# Patient Record
Sex: MALE | Race: WHITE | NOT HISPANIC OR LATINO | Employment: FULL TIME | URBAN - METROPOLITAN AREA
[De-identification: names, ages, dates, MRNs, and addresses within clinical notes are randomized per-mention and may not be internally consistent; named-entity substitution may affect disease eponyms.]

---

## 2019-06-03 PROBLEM — E66.01 SEVERE OBESITY (BMI 35.0-39.9) WITH COMORBIDITY: Status: ACTIVE | Noted: 2019-06-03

## 2019-12-09 PROBLEM — I10 ESSENTIAL HYPERTENSION: Status: ACTIVE | Noted: 2019-12-09

## 2020-05-11 ENCOUNTER — TELEPHONE (OUTPATIENT)
Dept: ORTHOPEDICS | Facility: CLINIC | Age: 45
End: 2020-05-11

## 2020-05-11 NOTE — TELEPHONE ENCOUNTER
Spoke with patient on the phone, appt made, called mother per pt request to give her address.    Afsaneh Lujan LPN

## 2020-05-12 ENCOUNTER — OFFICE VISIT (OUTPATIENT)
Dept: ORTHOPEDICS | Facility: CLINIC | Age: 45
End: 2020-05-12
Payer: COMMERCIAL

## 2020-05-12 ENCOUNTER — TELEPHONE (OUTPATIENT)
Dept: ORTHOPEDICS | Facility: CLINIC | Age: 45
End: 2020-05-12

## 2020-05-12 ENCOUNTER — LAB VISIT (OUTPATIENT)
Dept: INTERNAL MEDICINE | Facility: CLINIC | Age: 45
End: 2020-05-12
Payer: COMMERCIAL

## 2020-05-12 ENCOUNTER — ANESTHESIA EVENT (OUTPATIENT)
Dept: SURGERY | Facility: HOSPITAL | Age: 45
End: 2020-05-12
Payer: COMMERCIAL

## 2020-05-12 VITALS
DIASTOLIC BLOOD PRESSURE: 98 MMHG | HEART RATE: 80 BPM | BODY MASS INDEX: 37.8 KG/M2 | WEIGHT: 270 LBS | HEIGHT: 71 IN | SYSTOLIC BLOOD PRESSURE: 160 MMHG

## 2020-05-12 DIAGNOSIS — S56.429A EXTENSOR TENDON LACERATION OF FINGER WITH OPEN WOUND, INITIAL ENCOUNTER: ICD-10-CM

## 2020-05-12 DIAGNOSIS — Z01.818 PREOP EXAMINATION: ICD-10-CM

## 2020-05-12 DIAGNOSIS — S62.620B OPEN DISPLACED FRACTURE OF MIDDLE PHALANX OF RIGHT INDEX FINGER, INITIAL ENCOUNTER: Primary | ICD-10-CM

## 2020-05-12 DIAGNOSIS — S61.209A EXTENSOR TENDON LACERATION OF FINGER WITH OPEN WOUND, INITIAL ENCOUNTER: ICD-10-CM

## 2020-05-12 DIAGNOSIS — S62.620B: ICD-10-CM

## 2020-05-12 DIAGNOSIS — Z01.818 PREOPERATIVE CLEARANCE: Primary | ICD-10-CM

## 2020-05-12 PROCEDURE — 99999 PR PBB SHADOW E&M-EST. PATIENT-LVL IV: CPT | Mod: PBBFAC,,, | Performed by: ORTHOPAEDIC SURGERY

## 2020-05-12 PROCEDURE — 99204 OFFICE O/P NEW MOD 45 MIN: CPT | Mod: S$GLB,,, | Performed by: ORTHOPAEDIC SURGERY

## 2020-05-12 PROCEDURE — 3080F DIAST BP >= 90 MM HG: CPT | Mod: CPTII,S$GLB,, | Performed by: ORTHOPAEDIC SURGERY

## 2020-05-12 PROCEDURE — U0002 COVID-19 LAB TEST NON-CDC: HCPCS

## 2020-05-12 PROCEDURE — 3008F PR BODY MASS INDEX (BMI) DOCUMENTED: ICD-10-PCS | Mod: CPTII,S$GLB,, | Performed by: ORTHOPAEDIC SURGERY

## 2020-05-12 PROCEDURE — 3077F PR MOST RECENT SYSTOLIC BLOOD PRESSURE >= 140 MM HG: ICD-10-PCS | Mod: CPTII,S$GLB,, | Performed by: ORTHOPAEDIC SURGERY

## 2020-05-12 PROCEDURE — 99204 PR OFFICE/OUTPT VISIT, NEW, LEVL IV, 45-59 MIN: ICD-10-PCS | Mod: S$GLB,,, | Performed by: ORTHOPAEDIC SURGERY

## 2020-05-12 PROCEDURE — 99999 PR PBB SHADOW E&M-EST. PATIENT-LVL IV: ICD-10-PCS | Mod: PBBFAC,,, | Performed by: ORTHOPAEDIC SURGERY

## 2020-05-12 PROCEDURE — 3077F SYST BP >= 140 MM HG: CPT | Mod: CPTII,S$GLB,, | Performed by: ORTHOPAEDIC SURGERY

## 2020-05-12 PROCEDURE — 3008F BODY MASS INDEX DOCD: CPT | Mod: CPTII,S$GLB,, | Performed by: ORTHOPAEDIC SURGERY

## 2020-05-12 PROCEDURE — 3080F PR MOST RECENT DIASTOLIC BLOOD PRESSURE >= 90 MM HG: ICD-10-PCS | Mod: CPTII,S$GLB,, | Performed by: ORTHOPAEDIC SURGERY

## 2020-05-12 RX ORDER — MUPIROCIN 20 MG/G
OINTMENT TOPICAL
Status: CANCELLED | OUTPATIENT
Start: 2020-05-12

## 2020-05-12 RX ORDER — SODIUM CHLORIDE 9 MG/ML
INJECTION, SOLUTION INTRAVENOUS CONTINUOUS
Status: CANCELLED | OUTPATIENT
Start: 2020-05-12

## 2020-05-12 NOTE — H&P (VIEW-ONLY)
[facility]  History & Physical  Orthopedics    SUBJECTIVE:      Chief Complaint: Right index finger injury    Referring Provider:      History of Present Illness:  Patient is a 45 y.o. right hand dominant male who presents today with complaints of right index finger injury. The patient was using a router for woodworking yesterday when the router ended up penetrating the radial aspect of his P2 of index finger. He denies any pulsatile bleeding, but was unable to move his digit. Per ER notes he presented to [place] ED where he had a Grade III open P2 fx of his index finger. He was given 1x ancef, his wound wound I&D, primarily closed, placed in finger splint, dc'd on kelfex, and sent to our clinic for initial evaluation. He endorses being up to date on tetanus and denies any dirt in the router. He denies any fevers/chills, CP, N/V, abd pain, bowel/bladder changes. Of note the patient had a distal radius fracture of his ipsilateral wrist 20 years prior that severely limited his ROM and had much pain of his wrist that caused him to have 4 corner fusion by a hand surgeon at [facility] 5 years ago (he doesn't remember the surgeon), but has recovered well other than having limited wrist extension. He also endorses his a superficial laceration over the volar aspect of his involved digit several years ago that required stitches that did not result in any nerve or tendon damage.     The patient is a/an .    Onset of symptoms/DOI was 1 day ago.    Symptoms are aggravated by activity and movement.    Symptoms are alleviated by rest, immobilization and medication.    Symptoms consist of pain, swelling, laceration and decreased ROM.    The patient rates their pain as a 9/10.    Attempted treatment(s) and/or interventions include rest, activity modification, anti-inflammatory medications, immobilization, I&D, closed reduction in the emergency department and splinting/casting.     The patient denies  any fevers, chills, N/V, D/C and presents for evaluation.       Past Medical History:   Diagnosis Date    GERD (gastroesophageal reflux disease)     Peptic ulcer      Past Surgical History:   Procedure Laterality Date    LEG SURGERY Bilateral     lymph node removal       Review of patient's allergies indicates:   Allergen Reactions    Tramadol Other (See Comments)     Feels bad     Social History     Social History Narrative    Not on file     Family History   Problem Relation Age of Onset    Diabetes Mother     No Known Problems Father     Down syndrome Sister     No Known Problems Brother     No Known Problems Daughter     No Known Problems Brother     No Known Problems Daughter          Current Outpatient Medications:     cephALEXin (KEFLEX) 500 MG capsule, Take 1 capsule (500 mg total) by mouth every 8 (eight) hours. for 5 days, Disp: 15 capsule, Rfl: 0    gabapentin (NEURONTIN) 800 MG tablet, Take 1 tablet (800 mg total) by mouth 3 (three) times daily., Disp: 90 tablet, Rfl: 11    HYDROcodone-acetaminophen (NORCO) 5-325 mg per tablet, Take 1 tablet by mouth every 6 (six) hours as needed for Pain., Disp: 12 tablet, Rfl: 0    lisinopril 10 MG tablet, Take 1 tablet (10 mg total) by mouth every evening., Disp: 90 tablet, Rfl: 3    meloxicam (MOBIC) 15 MG tablet, Take 1 tablet (15 mg total) by mouth daily as needed for Pain., Disp: 30 tablet, Rfl: 11    omega-3 fatty acids-epa 1 gram Cap, Take 2 capsules by mouth 2 (two) times daily. (Patient not taking: Reported on 12/9/2019), Disp: 30 capsule, Rfl: 0    pravastatin (PRAVACHOL) 40 MG tablet, Take 1 tablet (40 mg total) by mouth every evening., Disp: 90 tablet, Rfl: 3    tiZANidine (ZANAFLEX) 4 MG tablet, Take 1 tablet (4 mg total) by mouth 3 (three) times daily as needed (muscle spasms)., Disp: 90 tablet, Rfl: 11    traZODone (DESYREL) 50 MG tablet, Take 1 tablet (50 mg total) by mouth nightly., Disp: 90 tablet, Rfl: 3      Review of  "Systems:  Constitutional: no fever or chills  Eyes: no visual changes  ENT: no nasal congestion or sore throat  Respiratory: no cough or shortness of breath  Cardiovascular: no chest pain  Gastrointestinal: no nausea or vomiting, tolerating diet  Musculoskeletal: arthralgias, myalgias, numbness/tingling and decreased ROM    OBJECTIVE:      Vital Signs (Most Recent):  Vitals:    05/12/20 1118   BP: (!) 160/98   Pulse: 80   Weight: 122.5 kg (270 lb)   Height: 5' 11" (1.803 m)     Body mass index is 37.66 kg/m².      Physical Exam:  Constitutional: The patient appears well-developed and well-nourished. No distress.   Head: Normocephalic and atraumatic.   Nose: Nose normal.   Eyes: Conjunctivae and EOM are normal.   Neck: No tracheal deviation present.   Cardiovascular: Normal rate and intact distal pulses.    Pulmonary/Chest: Effort normal. No respiratory distress.   Abdominal: There is no guarding.   Neurological: The patient is alert.   Psychiatric: The patient has a normal mood and affect.     Right Hand/Wrist Examination:    Observation/Inspection:  Swelling  YES    Deformity  YES  Discoloration  none     Scars   none    Atrophy  None  4cm oblique laceration over the radial aspect of his index finger that starts proximal to DIPJ , traverses PIPJ, and stops distal to MCPJ. There is mild SS drainage, but no purulence. Stitches are in place and intact w/out signs of dehiscence.   Unable to appreciate healed volar laceration and has a healed transverse surgical scar over dorsal wrist from his 4 corner fusion    HAND/WRIST EXAMINATION:  Finkelstein's Test   Neg  WHAT Test    Neg  Snuff box tenderness   Neg  Rhodes's Test    Neg  Hook of Hamate Tenderness  Neg  CMC grind    Neg  Circumduction test   Neg    Neurovascular Exam:  Digits WWP, brisk CR < 3s throughout  NVI motor/LTS to M/R/U nerves, radial pulse 2+  Tinel's Test - Carpal Tunnel  Neg  Tinel's Test - Cubital Tunnel  Neg  Phalen's Test    Neg  Median Nerve " Compression Test Neg    ROM wrist/elbow full, painless    FDS, FDP of index finger intact, but KIMMIE not intact. ROM was very limited 2/2 to finger edema and pain  Cap refill <3s in distal phalanx  Sensation intact to light touch over radial and ulnar aspect of index finger    RRR  CTAB  Abd S/NT/ND +BS    Diagnostic Results:     Xray - Open comminuted P2 of index finger with significant loss of bone.   EMG - none    ASSESSMENT/PLAN:      Kelby Finley is a 45 y.o. 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 He was counseled that not only does his fracture need to be reduced, he would need his wound I&D'd, likely need repair of his R extensor indices tendon, possibly bone grafting to bridge his bone loss of his P2, and possibly repair any nerves that are damaged. He is agreeable to this and would like to proceed with the above tomorrow 5/13/20. Consents were signed and surgery was booked during this visit. He was also placed in a removable dorsal finger splint as well.      Please be aware that this note has been generated with the assistance of Ada voice-to-text.  Please excuse any spelling or grammatical errors.

## 2020-05-12 NOTE — LETTER
May 12, 2020      No Recipients           [facility]  [address]  [place] [address]  Phone: [phone]  Fax: [phone]          Patient: Kelby Finley   MR Number: [id number]   YOB: 1975   Date of Visit: 5/12/2020       Dear :    Thank you for referring Kelby Finley to me for evaluation. Attached you will find relevant portions of my assessment and plan of care.    If you have questions, please do not hesitate to call me. I look forward to following Kelby Finley along with you.    Sincerely,    Randal Odom MD    Enclosure  CC:  No Recipients    If you would like to receive this communication electronically, please contact [email or web address] or [phone] to request more information on [employer] Link access.    For providers and/or their staff who would like to refer a patient to [facility], please contact us through our one-stop-shop provider referral line, [facility], at [phone].    If you feel you have received this communication in error or would no longer like to receive these types of communications, please e-mail [email or web address]

## 2020-05-12 NOTE — TELEPHONE ENCOUNTER
Spoke with patient on the phone and gave him his surgery arrival time of 8:15am and also reminded  Him to not eat or drink after midnight and about his 2 week post op appointment, pt verbalized understanding.    Afsaneh Lujan LPN

## 2020-05-12 NOTE — H&P
[facility]  History & Physical  Orthopedics    SUBJECTIVE:      Chief Complaint: Right index finger injury    Referring Provider:      History of Present Illness:  Patient is a 45 y.o. right hand dominant male who presents today with complaints of right index finger injury. The patient was using a router for woodworking yesterday when the router ended up penetrating the radial aspect of his P2 of index finger. He denies any pulsatile bleeding, but was unable to move his digit. Per ER notes he presented to [place] ED where he had a Grade III open P2 fx of his index finger. He was given 1x ancef, his wound wound I&D, primarily closed, placed in finger splint, dc'd on kelfex, and sent to our clinic for initial evaluation. He endorses being up to date on tetanus and denies any dirt in the router. He denies any fevers/chills, CP, N/V, abd pain, bowel/bladder changes. Of note the patient had a distal radius fracture of his ipsilateral wrist 20 years prior that severely limited his ROM and had much pain of his wrist that caused him to have 4 corner fusion by a hand surgeon at [facility] 5 years ago (he doesn't remember the surgeon), but has recovered well other than having limited wrist extension. He also endorses his a superficial laceration over the volar aspect of his involved digit several years ago that required stitches that did not result in any nerve or tendon damage.     The patient is a/an .    Onset of symptoms/DOI was 1 day ago.    Symptoms are aggravated by activity and movement.    Symptoms are alleviated by rest, immobilization and medication.    Symptoms consist of pain, swelling, laceration and decreased ROM.    The patient rates their pain as a 9/10.    Attempted treatment(s) and/or interventions include rest, activity modification, anti-inflammatory medications, immobilization, I&D, closed reduction in the emergency department and splinting/casting.     The patient denies  any fevers, chills, N/V, D/C and presents for evaluation.       Past Medical History:   Diagnosis Date    GERD (gastroesophageal reflux disease)     Peptic ulcer      Past Surgical History:   Procedure Laterality Date    LEG SURGERY Bilateral     lymph node removal       Review of patient's allergies indicates:   Allergen Reactions    Tramadol Other (See Comments)     Feels bad     Social History     Social History Narrative    Not on file     Family History   Problem Relation Age of Onset    Diabetes Mother     No Known Problems Father     Down syndrome Sister     No Known Problems Brother     No Known Problems Daughter     No Known Problems Brother     No Known Problems Daughter          Current Outpatient Medications:     cephALEXin (KEFLEX) 500 MG capsule, Take 1 capsule (500 mg total) by mouth every 8 (eight) hours. for 5 days, Disp: 15 capsule, Rfl: 0    gabapentin (NEURONTIN) 800 MG tablet, Take 1 tablet (800 mg total) by mouth 3 (three) times daily., Disp: 90 tablet, Rfl: 11    HYDROcodone-acetaminophen (NORCO) 5-325 mg per tablet, Take 1 tablet by mouth every 6 (six) hours as needed for Pain., Disp: 12 tablet, Rfl: 0    lisinopril 10 MG tablet, Take 1 tablet (10 mg total) by mouth every evening., Disp: 90 tablet, Rfl: 3    meloxicam (MOBIC) 15 MG tablet, Take 1 tablet (15 mg total) by mouth daily as needed for Pain., Disp: 30 tablet, Rfl: 11    omega-3 fatty acids-epa 1 gram Cap, Take 2 capsules by mouth 2 (two) times daily. (Patient not taking: Reported on 12/9/2019), Disp: 30 capsule, Rfl: 0    pravastatin (PRAVACHOL) 40 MG tablet, Take 1 tablet (40 mg total) by mouth every evening., Disp: 90 tablet, Rfl: 3    tiZANidine (ZANAFLEX) 4 MG tablet, Take 1 tablet (4 mg total) by mouth 3 (three) times daily as needed (muscle spasms)., Disp: 90 tablet, Rfl: 11    traZODone (DESYREL) 50 MG tablet, Take 1 tablet (50 mg total) by mouth nightly., Disp: 90 tablet, Rfl: 3      Review of  "Systems:  Constitutional: no fever or chills  Eyes: no visual changes  ENT: no nasal congestion or sore throat  Respiratory: no cough or shortness of breath  Cardiovascular: no chest pain  Gastrointestinal: no nausea or vomiting, tolerating diet  Musculoskeletal: arthralgias, myalgias, numbness/tingling and decreased ROM    OBJECTIVE:      Vital Signs (Most Recent):  Vitals:    05/12/20 1118   BP: (!) 160/98   Pulse: 80   Weight: 122.5 kg (270 lb)   Height: 5' 11" (1.803 m)     Body mass index is 37.66 kg/m².      Physical Exam:  Constitutional: The patient appears well-developed and well-nourished. No distress.   Head: Normocephalic and atraumatic.   Nose: Nose normal.   Eyes: Conjunctivae and EOM are normal.   Neck: No tracheal deviation present.   Cardiovascular: Normal rate and intact distal pulses.    Pulmonary/Chest: Effort normal. No respiratory distress.   Abdominal: There is no guarding.   Neurological: The patient is alert.   Psychiatric: The patient has a normal mood and affect.     Right Hand/Wrist Examination:    Observation/Inspection:  Swelling  YES    Deformity  YES  Discoloration  none     Scars   none    Atrophy  None  4cm oblique laceration over the radial aspect of his index finger that starts proximal to DIPJ , traverses PIPJ, and stops distal to MCPJ. There is mild SS drainage, but no purulence. Stitches are in place and intact w/out signs of dehiscence.   Unable to appreciate healed volar laceration and has a healed transverse surgical scar over dorsal wrist from his 4 corner fusion    HAND/WRIST EXAMINATION:  Finkelstein's Test   Neg  WHAT Test    Neg  Snuff box tenderness   Neg  Rhodes's Test    Neg  Hook of Hamate Tenderness  Neg  CMC grind    Neg  Circumduction test   Neg    Neurovascular Exam:  Digits WWP, brisk CR < 3s throughout  NVI motor/LTS to M/R/U nerves, radial pulse 2+  Tinel's Test - Carpal Tunnel  Neg  Tinel's Test - Cubital Tunnel  Neg  Phalen's Test    Neg  Median Nerve " Compression Test Neg    ROM wrist/elbow full, painless    FDS, FDP of index finger intact, but KIMMIE not intact. ROM was very limited 2/2 to finger edema and pain  Cap refill <3s in distal phalanx  Sensation intact to light touch over radial and ulnar aspect of index finger    RRR  CTAB  Abd S/NT/ND +BS    Diagnostic Results:     Xray - Open comminuted P2 of index finger with significant loss of bone.   EMG - none    ASSESSMENT/PLAN:      Kelby Finley is a 45 y.o. 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 He was counseled that not only does his fracture need to be reduced, he would need his wound I&D'd, likely need repair of his R extensor indices tendon, possibly bone grafting to bridge his bone loss of his P2, and possibly repair any nerves that are damaged. He is agreeable to this and would like to proceed with the above tomorrow 5/13/20. Consents were signed and surgery was booked during this visit. He was also placed in a removable dorsal finger splint as well.      Please be aware that this note has been generated with the assistance of Ada voice-to-text.  Please excuse any spelling or grammatical errors.

## 2020-05-12 NOTE — PROGRESS NOTES
[facility]  History & Physical  Orthopedics    SUBJECTIVE:      Chief Complaint: Right index finger injury    Referring Provider:      History of Present Illness:  Patient is a 45 y.o. right hand dominant male who presents today with complaints of right index finger injury. The patient was using a router for woodworking yesterday when the router ended up penetrating the radial aspect of his P2 of index finger. He denies any pulsatile bleeding, but was unable to move his digit. Per ER notes he presented to [place] ED where he had a Grade III open P2 fx of his index finger. He was given 1x ancef, his wound wound I&D, primarily closed, placed in finger splint, dc'd on kelfex, and sent to our clinic for initial evaluation. He endorses being up to date on tetanus and denies any dirt in the router. He denies any fevers/chills, CP, N/V, abd pain, bowel/bladder changes. Of note the patient had a distal radius fracture of his ipsilateral wrist 20 years prior that severely limited his ROM and had much pain of his wrist that caused him to have 4 corner fusion by a hand surgeon at [facility] 5 years ago (he doesn't remember the surgeon), but has recovered well other than having limited wrist extension. He also endorses his a superficial laceration over the volar aspect of his involved digit several years ago that required stitches that did not result in any nerve or tendon damage.     The patient is a/an .    Onset of symptoms/DOI was 1 day ago.    Symptoms are aggravated by activity and movement.    Symptoms are alleviated by rest, immobilization and medication.    Symptoms consist of pain, swelling, laceration and decreased ROM.    The patient rates their pain as a 9/10.    Attempted treatment(s) and/or interventions include rest, activity modification, anti-inflammatory medications, immobilization, I&D, closed reduction in the emergency department and splinting/casting.     The patient denies  any fevers, chills, N/V, D/C and presents for evaluation.       Past Medical History:   Diagnosis Date    GERD (gastroesophageal reflux disease)     Peptic ulcer      Past Surgical History:   Procedure Laterality Date    LEG SURGERY Bilateral     lymph node removal       Review of patient's allergies indicates:   Allergen Reactions    Tramadol Other (See Comments)     Feels bad     Social History     Social History Narrative    Not on file     Family History   Problem Relation Age of Onset    Diabetes Mother     No Known Problems Father     Down syndrome Sister     No Known Problems Brother     No Known Problems Daughter     No Known Problems Brother     No Known Problems Daughter          Current Outpatient Medications:     cephALEXin (KEFLEX) 500 MG capsule, Take 1 capsule (500 mg total) by mouth every 8 (eight) hours. for 5 days, Disp: 15 capsule, Rfl: 0    gabapentin (NEURONTIN) 800 MG tablet, Take 1 tablet (800 mg total) by mouth 3 (three) times daily., Disp: 90 tablet, Rfl: 11    HYDROcodone-acetaminophen (NORCO) 5-325 mg per tablet, Take 1 tablet by mouth every 6 (six) hours as needed for Pain., Disp: 12 tablet, Rfl: 0    lisinopril 10 MG tablet, Take 1 tablet (10 mg total) by mouth every evening., Disp: 90 tablet, Rfl: 3    meloxicam (MOBIC) 15 MG tablet, Take 1 tablet (15 mg total) by mouth daily as needed for Pain., Disp: 30 tablet, Rfl: 11    omega-3 fatty acids-epa 1 gram Cap, Take 2 capsules by mouth 2 (two) times daily. (Patient not taking: Reported on 12/9/2019), Disp: 30 capsule, Rfl: 0    pravastatin (PRAVACHOL) 40 MG tablet, Take 1 tablet (40 mg total) by mouth every evening., Disp: 90 tablet, Rfl: 3    tiZANidine (ZANAFLEX) 4 MG tablet, Take 1 tablet (4 mg total) by mouth 3 (three) times daily as needed (muscle spasms)., Disp: 90 tablet, Rfl: 11    traZODone (DESYREL) 50 MG tablet, Take 1 tablet (50 mg total) by mouth nightly., Disp: 90 tablet, Rfl: 3      Review of  "Systems:  Constitutional: no fever or chills  Eyes: no visual changes  ENT: no nasal congestion or sore throat  Respiratory: no cough or shortness of breath  Cardiovascular: no chest pain  Gastrointestinal: no nausea or vomiting, tolerating diet  Musculoskeletal: arthralgias, myalgias, numbness/tingling and decreased ROM    OBJECTIVE:      Vital Signs (Most Recent):  Vitals:    05/12/20 1118   BP: (!) 160/98   Pulse: 80   Weight: 122.5 kg (270 lb)   Height: 5' 11" (1.803 m)     Body mass index is 37.66 kg/m².      Physical Exam:  Constitutional: The patient appears well-developed and well-nourished. No distress.   Head: Normocephalic and atraumatic.   Nose: Nose normal.   Eyes: Conjunctivae and EOM are normal.   Neck: No tracheal deviation present.   Cardiovascular: Normal rate and intact distal pulses.    Pulmonary/Chest: Effort normal. No respiratory distress.   Abdominal: There is no guarding.   Neurological: The patient is alert.   Psychiatric: The patient has a normal mood and affect.     Right Hand/Wrist Examination:    Observation/Inspection:  Swelling  YES    Deformity  YES  Discoloration  none     Scars   none    Atrophy  None  4cm oblique laceration over the radial aspect of his index finger that starts proximal to DIPJ , traverses PIPJ, and stops distal to MCPJ. There is mild SS drainage, but no purulence. Stitches are in place and intact w/out signs of dehiscence.   Unable to appreciate healed volar laceration and has a healed transverse surgical scar over dorsal wrist from his 4 corner fusion    HAND/WRIST EXAMINATION:  Finkelstein's Test   Neg  WHAT Test    Neg  Snuff box tenderness   Neg  Rhodes's Test    Neg  Hook of Hamate Tenderness  Neg  CMC grind    Neg  Circumduction test   Neg    Neurovascular Exam:  Digits WWP, brisk CR < 3s throughout  NVI motor/LTS to M/R/U nerves, radial pulse 2+  Tinel's Test - Carpal Tunnel  Neg  Tinel's Test - Cubital Tunnel  Neg  Phalen's Test    Neg  Median Nerve " Compression Test Neg    ROM wrist/elbow full, painless    FDS, FDP of index finger intact, but KIMMIE not intact. ROM was very limited 2/2 to finger edema and pain  Cap refill <3s in distal phalanx  Sensation intact to light touch over radial and ulnar aspect of index finger    RRR  CTAB  Abd S/NT/ND +BS    Diagnostic Results:     Xray - Open comminuted P2 of index finger with significant loss of bone.   EMG - none    ASSESSMENT/PLAN:      Kelby Finley is a 45 y.o. 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 He was counseled that not only does his fracture need to be reduced, he would need his wound I&D'd, likely need repair of his R extensor indices tendon, possibly bone grafting to bridge his bone loss of his P2, and possibly repair any nerves that are damaged. He is agreeable to this and would like to proceed with the above tomorrow 5/13/20. Consents were signed and surgery was booked during this visit. He was also placed in a removable dorsal finger splint as well.      Please be aware that this note has been generated with the assistance of [employer] voice-to-text.  Please excuse any spelling or grammatical errors.

## 2020-05-13 ENCOUNTER — ANESTHESIA (OUTPATIENT)
Dept: SURGERY | Facility: HOSPITAL | Age: 45
End: 2020-05-13
Payer: COMMERCIAL

## 2020-05-13 ENCOUNTER — HOSPITAL ENCOUNTER (OUTPATIENT)
Facility: HOSPITAL | Age: 45
Discharge: HOME | End: 2020-05-13
Attending: ORTHOPAEDIC SURGERY | Admitting: ORTHOPAEDIC SURGERY
Payer: COMMERCIAL

## 2020-05-13 VITALS
TEMPERATURE: 98 F | DIASTOLIC BLOOD PRESSURE: 76 MMHG | HEIGHT: 71 IN | WEIGHT: 270 LBS | RESPIRATION RATE: 20 BRPM | HEART RATE: 78 BPM | BODY MASS INDEX: 37.8 KG/M2 | SYSTOLIC BLOOD PRESSURE: 139 MMHG | OXYGEN SATURATION: 95 %

## 2020-05-13 DIAGNOSIS — S62.620B OPEN DISPLACED FRACTURE OF MIDDLE PHALANX OF RIGHT INDEX FINGER, INITIAL ENCOUNTER: ICD-10-CM

## 2020-05-13 DIAGNOSIS — S61.209A EXTENSOR TENDON LACERATION OF FINGER WITH OPEN WOUND, INITIAL ENCOUNTER: ICD-10-CM

## 2020-05-13 DIAGNOSIS — Z01.818 PREOP EXAMINATION: ICD-10-CM

## 2020-05-13 DIAGNOSIS — S62.620B: Primary | ICD-10-CM

## 2020-05-13 DIAGNOSIS — S56.429A EXTENSOR TENDON LACERATION OF FINGER WITH OPEN WOUND, INITIAL ENCOUNTER: ICD-10-CM

## 2020-05-13 LAB — SARS-COV-2 RNA RESP QL NAA+PROBE: NOT DETECTED

## 2020-05-13 PROCEDURE — 76942 PR U/S GUIDANCE FOR NEEDLE GUIDANCE: ICD-10-PCS | Mod: 26,,, | Performed by: ANESTHESIOLOGY

## 2020-05-13 PROCEDURE — 64415 PR NERVE BLOCK INJ, ANES/STEROID, BRACHIAL PLEXUS, INCL IMAG GUIDANCE: ICD-10-PCS | Mod: 59,RT,, | Performed by: ANESTHESIOLOGY

## 2020-05-13 PROCEDURE — S0028 INJECTION, FAMOTIDINE, 20 MG: HCPCS | Performed by: NURSE ANESTHETIST, CERTIFIED REGISTERED

## 2020-05-13 PROCEDURE — 11012 PR DEBRIDE ASSOC OPEN FX/DISLO SKIN/MUS/BONE: ICD-10-PCS | Mod: 51,,, | Performed by: ORTHOPAEDIC SURGERY

## 2020-05-13 PROCEDURE — 20690 APPL UNIPLN UNI EXT FIXJ SYS: CPT | Mod: F6,,, | Performed by: ORTHOPAEDIC SURGERY

## 2020-05-13 PROCEDURE — 63600175 PHARM REV CODE 636 W HCPCS: Performed by: ANESTHESIOLOGY

## 2020-05-13 PROCEDURE — 25000003 PHARM REV CODE 250: Performed by: ORTHOPAEDIC SURGERY

## 2020-05-13 PROCEDURE — D9220A PRA ANESTHESIA: Mod: ANES,,, | Performed by: ANESTHESIOLOGY

## 2020-05-13 PROCEDURE — 37000008 HC ANESTHESIA 1ST 15 MINUTES: Performed by: ORTHOPAEDIC SURGERY

## 2020-05-13 PROCEDURE — 26735 TREAT FINGER FRACTURE EACH: CPT | Mod: 51,F6,, | Performed by: ORTHOPAEDIC SURGERY

## 2020-05-13 PROCEDURE — 99900035 HC TECH TIME PER 15 MIN (STAT)

## 2020-05-13 PROCEDURE — D9220A PRA ANESTHESIA: ICD-10-PCS | Mod: ANES,,, | Performed by: ANESTHESIOLOGY

## 2020-05-13 PROCEDURE — D9220A PRA ANESTHESIA: Mod: CRNA,,, | Performed by: NURSE ANESTHETIST, CERTIFIED REGISTERED

## 2020-05-13 PROCEDURE — 76942 ECHO GUIDE FOR BIOPSY: CPT | Mod: 26,,, | Performed by: ANESTHESIOLOGY

## 2020-05-13 PROCEDURE — 20900 REMOVAL OF BONE FOR GRAFT: CPT | Mod: 51,RT,, | Performed by: ORTHOPAEDIC SURGERY

## 2020-05-13 PROCEDURE — 27201423 OPTIME MED/SURG SUP & DEVICES STERILE SUPPLY: Performed by: ORTHOPAEDIC SURGERY

## 2020-05-13 PROCEDURE — 36000709 HC OR TIME LEV III EA ADD 15 MIN: Performed by: ORTHOPAEDIC SURGERY

## 2020-05-13 PROCEDURE — 11012 DEB SKIN BONE AT FX SITE: CPT | Mod: 51,,, | Performed by: ORTHOPAEDIC SURGERY

## 2020-05-13 PROCEDURE — 27800903 OPTIME MED/SURG SUP & DEVICES OTHER IMPLANTS: Performed by: ORTHOPAEDIC SURGERY

## 2020-05-13 PROCEDURE — 64415 NJX AA&/STRD BRCH PLXS IMG: CPT | Mod: 59,RT,, | Performed by: ANESTHESIOLOGY

## 2020-05-13 PROCEDURE — 71000033 HC RECOVERY, INTIAL HOUR: Performed by: ORTHOPAEDIC SURGERY

## 2020-05-13 PROCEDURE — 25000003 PHARM REV CODE 250: Performed by: ANESTHESIOLOGY

## 2020-05-13 PROCEDURE — 63600175 PHARM REV CODE 636 W HCPCS: Performed by: ORTHOPAEDIC SURGERY

## 2020-05-13 PROCEDURE — 63600175 PHARM REV CODE 636 W HCPCS: Performed by: NURSE ANESTHETIST, CERTIFIED REGISTERED

## 2020-05-13 PROCEDURE — 27200750 HC INSULATED NEEDLE/ STIMUPLEX: Performed by: ANESTHESIOLOGY

## 2020-05-13 PROCEDURE — 94761 N-INVAS EAR/PLS OXIMETRY MLT: CPT

## 2020-05-13 PROCEDURE — 20900 PR REMV BONE FOR GRAFT MINOR: ICD-10-PCS | Mod: 51,RT,, | Performed by: ORTHOPAEDIC SURGERY

## 2020-05-13 PROCEDURE — 64415 NJX AA&/STRD BRCH PLXS IMG: CPT | Performed by: ANESTHESIOLOGY

## 2020-05-13 PROCEDURE — D9220A PRA ANESTHESIA: ICD-10-PCS | Mod: CRNA,,, | Performed by: NURSE ANESTHETIST, CERTIFIED REGISTERED

## 2020-05-13 PROCEDURE — 71000015 HC POSTOP RECOV 1ST HR: Performed by: ORTHOPAEDIC SURGERY

## 2020-05-13 PROCEDURE — 36000708 HC OR TIME LEV III 1ST 15 MIN: Performed by: ORTHOPAEDIC SURGERY

## 2020-05-13 PROCEDURE — 37000009 HC ANESTHESIA EA ADD 15 MINS: Performed by: ORTHOPAEDIC SURGERY

## 2020-05-13 PROCEDURE — 25000003 PHARM REV CODE 250: Performed by: NURSE ANESTHETIST, CERTIFIED REGISTERED

## 2020-05-13 PROCEDURE — 26418 PR REPAIR EXTEN TENDON,DORSUM FINGR,EA: ICD-10-PCS | Mod: 51,F6,, | Performed by: ORTHOPAEDIC SURGERY

## 2020-05-13 PROCEDURE — 26418 REPAIR FINGER TENDON: CPT | Mod: 51,F6,, | Performed by: ORTHOPAEDIC SURGERY

## 2020-05-13 PROCEDURE — 26735 PR OPEN TX PHALANGEAL SHAFT FRACTURE PROX/MIDDLE EA: ICD-10-PCS | Mod: 51,F6,, | Performed by: ORTHOPAEDIC SURGERY

## 2020-05-13 PROCEDURE — C1769 GUIDE WIRE: HCPCS | Performed by: ORTHOPAEDIC SURGERY

## 2020-05-13 PROCEDURE — 20690 PR APPLY BONE UNIPLANE,EXT FIX DEV: ICD-10-PCS | Mod: F6,,, | Performed by: ORTHOPAEDIC SURGERY

## 2020-05-13 PROCEDURE — C1713 ANCHOR/SCREW BN/BN,TIS/BN: HCPCS | Performed by: ORTHOPAEDIC SURGERY

## 2020-05-13 PROCEDURE — 76942 ECHO GUIDE FOR BIOPSY: CPT | Performed by: ANESTHESIOLOGY

## 2020-05-13 PROCEDURE — 94770 HC EXHALED C02 TEST: CPT

## 2020-05-13 DEVICE — TACK PLATE .40IN: Type: IMPLANTABLE DEVICE | Site: FINGER | Status: FUNCTIONAL

## 2020-05-13 DEVICE — IMPLANTABLE DEVICE: Type: IMPLANTABLE DEVICE | Site: FINGER | Status: FUNCTIONAL

## 2020-05-13 DEVICE — SCREW BONE HEXALOBE  1.5 X 10: Type: IMPLANTABLE DEVICE | Site: FINGER | Status: FUNCTIONAL

## 2020-05-13 RX ORDER — MIDAZOLAM HYDROCHLORIDE 1 MG/ML
INJECTION, SOLUTION INTRAMUSCULAR; INTRAVENOUS
Status: DISCONTINUED | OUTPATIENT
Start: 2020-05-13 | End: 2020-05-13

## 2020-05-13 RX ORDER — DEXAMETHASONE SODIUM PHOSPHATE 4 MG/ML
INJECTION, SOLUTION INTRA-ARTICULAR; INTRALESIONAL; INTRAMUSCULAR; INTRAVENOUS; SOFT TISSUE
Status: DISCONTINUED | OUTPATIENT
Start: 2020-05-13 | End: 2020-05-13

## 2020-05-13 RX ORDER — MUPIROCIN 20 MG/G
OINTMENT TOPICAL 2 TIMES DAILY
Status: DISCONTINUED | OUTPATIENT
Start: 2020-05-13 | End: 2020-05-13 | Stop reason: HOSPADM

## 2020-05-13 RX ORDER — ONDANSETRON 2 MG/ML
4 INJECTION INTRAMUSCULAR; INTRAVENOUS EVERY 12 HOURS PRN
Status: DISCONTINUED | OUTPATIENT
Start: 2020-05-13 | End: 2020-05-13 | Stop reason: HOSPADM

## 2020-05-13 RX ORDER — OXYCODONE AND ACETAMINOPHEN 7.5; 325 MG/1; MG/1
1 TABLET ORAL EVERY 4 HOURS PRN
Qty: 42 TABLET | Refills: 0 | Status: SHIPPED | OUTPATIENT
Start: 2020-05-13 | End: 2020-05-26

## 2020-05-13 RX ORDER — FENTANYL CITRATE 50 UG/ML
25 INJECTION, SOLUTION INTRAMUSCULAR; INTRAVENOUS EVERY 5 MIN PRN
Status: DISCONTINUED | OUTPATIENT
Start: 2020-05-13 | End: 2020-05-13 | Stop reason: HOSPADM

## 2020-05-13 RX ORDER — PROPOFOL 10 MG/ML
VIAL (ML) INTRAVENOUS
Status: DISCONTINUED | OUTPATIENT
Start: 2020-05-13 | End: 2020-05-13

## 2020-05-13 RX ORDER — PROPOFOL 10 MG/ML
VIAL (ML) INTRAVENOUS CONTINUOUS PRN
Status: DISCONTINUED | OUTPATIENT
Start: 2020-05-13 | End: 2020-05-13

## 2020-05-13 RX ORDER — OXYCODONE HYDROCHLORIDE 5 MG/1
10 TABLET ORAL EVERY 4 HOURS PRN
Status: DISCONTINUED | OUTPATIENT
Start: 2020-05-13 | End: 2020-05-13 | Stop reason: HOSPADM

## 2020-05-13 RX ORDER — CEFAZOLIN SODIUM 1 G/3ML
2 INJECTION, POWDER, FOR SOLUTION INTRAMUSCULAR; INTRAVENOUS
Status: DISCONTINUED | OUTPATIENT
Start: 2020-05-13 | End: 2020-05-13 | Stop reason: HOSPADM

## 2020-05-13 RX ORDER — HYDROCODONE BITARTRATE AND ACETAMINOPHEN 5; 325 MG/1; MG/1
1 TABLET ORAL EVERY 4 HOURS PRN
Status: DISCONTINUED | OUTPATIENT
Start: 2020-05-13 | End: 2020-05-13 | Stop reason: HOSPADM

## 2020-05-13 RX ORDER — ONDANSETRON 2 MG/ML
INJECTION INTRAMUSCULAR; INTRAVENOUS
Status: DISCONTINUED | OUTPATIENT
Start: 2020-05-13 | End: 2020-05-13

## 2020-05-13 RX ORDER — ONDANSETRON 4 MG/1
8 TABLET, ORALLY DISINTEGRATING ORAL EVERY 8 HOURS PRN
Status: DISCONTINUED | OUTPATIENT
Start: 2020-05-13 | End: 2020-05-13 | Stop reason: HOSPADM

## 2020-05-13 RX ORDER — SODIUM CHLORIDE 9 MG/ML
INJECTION, SOLUTION INTRAVENOUS CONTINUOUS
Status: DISCONTINUED | OUTPATIENT
Start: 2020-05-13 | End: 2020-05-13 | Stop reason: HOSPADM

## 2020-05-13 RX ORDER — SULFAMETHOXAZOLE AND TRIMETHOPRIM 800; 160 MG/1; MG/1
1 TABLET ORAL 2 TIMES DAILY
Qty: 28 TABLET | Refills: 0 | Status: SHIPPED | OUTPATIENT
Start: 2020-05-13 | End: 2020-06-24

## 2020-05-13 RX ORDER — LIDOCAINE HYDROCHLORIDE 20 MG/ML
INJECTION INTRAVENOUS
Status: DISCONTINUED | OUTPATIENT
Start: 2020-05-13 | End: 2020-05-13

## 2020-05-13 RX ORDER — CELECOXIB 200 MG/1
400 CAPSULE ORAL
Status: COMPLETED | OUTPATIENT
Start: 2020-05-13 | End: 2020-05-13

## 2020-05-13 RX ORDER — OXYCODONE HYDROCHLORIDE 5 MG/1
5 TABLET ORAL
Status: DISCONTINUED | OUTPATIENT
Start: 2020-05-13 | End: 2020-05-13 | Stop reason: HOSPADM

## 2020-05-13 RX ORDER — MIDAZOLAM HYDROCHLORIDE 1 MG/ML
0.5 INJECTION INTRAMUSCULAR; INTRAVENOUS
Status: DISCONTINUED | OUTPATIENT
Start: 2020-05-13 | End: 2020-05-13 | Stop reason: HOSPADM

## 2020-05-13 RX ORDER — SCOLOPAMINE TRANSDERMAL SYSTEM 1 MG/1
1 PATCH, EXTENDED RELEASE TRANSDERMAL
Status: DISCONTINUED | OUTPATIENT
Start: 2020-05-13 | End: 2020-05-13 | Stop reason: HOSPADM

## 2020-05-13 RX ORDER — SODIUM CHLORIDE 0.9 % (FLUSH) 0.9 %
10 SYRINGE (ML) INJECTION
Status: DISCONTINUED | OUTPATIENT
Start: 2020-05-13 | End: 2020-05-13 | Stop reason: HOSPADM

## 2020-05-13 RX ORDER — ACETAMINOPHEN 500 MG
1000 TABLET ORAL
Status: COMPLETED | OUTPATIENT
Start: 2020-05-13 | End: 2020-05-13

## 2020-05-13 RX ORDER — FAMOTIDINE 10 MG/ML
INJECTION INTRAVENOUS
Status: DISCONTINUED | OUTPATIENT
Start: 2020-05-13 | End: 2020-05-13

## 2020-05-13 RX ORDER — MUPIROCIN 20 MG/G
OINTMENT TOPICAL
Status: DISCONTINUED | OUTPATIENT
Start: 2020-05-13 | End: 2020-05-13 | Stop reason: HOSPADM

## 2020-05-13 RX ORDER — ACETAMINOPHEN 325 MG/1
650 TABLET ORAL EVERY 4 HOURS PRN
Status: DISCONTINUED | OUTPATIENT
Start: 2020-05-13 | End: 2020-05-13 | Stop reason: HOSPADM

## 2020-05-13 RX ADMIN — PROPOFOL 20 MG: 10 INJECTION, EMULSION INTRAVENOUS at 11:05

## 2020-05-13 RX ADMIN — SODIUM CHLORIDE: 0.9 INJECTION, SOLUTION INTRAVENOUS at 09:05

## 2020-05-13 RX ADMIN — MIDAZOLAM 1 MG: 1 INJECTION INTRAMUSCULAR; INTRAVENOUS at 11:05

## 2020-05-13 RX ADMIN — FENTANYL CITRATE 100 MCG: 50 INJECTION INTRAMUSCULAR; INTRAVENOUS at 09:05

## 2020-05-13 RX ADMIN — MIDAZOLAM 2 MG: 1 INJECTION INTRAMUSCULAR; INTRAVENOUS at 09:05

## 2020-05-13 RX ADMIN — DEXAMETHASONE SODIUM PHOSPHATE 8 MG: 4 INJECTION, SOLUTION INTRAMUSCULAR; INTRAVENOUS at 10:05

## 2020-05-13 RX ADMIN — PROPOFOL 125 MCG/KG/MIN: 10 INJECTION, EMULSION INTRAVENOUS at 09:05

## 2020-05-13 RX ADMIN — MIDAZOLAM HYDROCHLORIDE 2 MG: 1 INJECTION, SOLUTION INTRAMUSCULAR; INTRAVENOUS at 09:05

## 2020-05-13 RX ADMIN — PROPOFOL 40 MG: 10 INJECTION, EMULSION INTRAVENOUS at 09:05

## 2020-05-13 RX ADMIN — MIDAZOLAM 1 MG: 1 INJECTION INTRAMUSCULAR; INTRAVENOUS at 12:05

## 2020-05-13 RX ADMIN — SODIUM CHLORIDE, SODIUM GLUCONATE, SODIUM ACETATE, POTASSIUM CHLORIDE, MAGNESIUM CHLORIDE, SODIUM PHOSPHATE, DIBASIC, AND POTASSIUM PHOSPHATE: .53; .5; .37; .037; .03; .012; .00082 INJECTION, SOLUTION INTRAVENOUS at 11:05

## 2020-05-13 RX ADMIN — ACETAMINOPHEN 1000 MG: 500 TABLET ORAL at 08:05

## 2020-05-13 RX ADMIN — SCOPALAMINE 1 PATCH: 1 PATCH, EXTENDED RELEASE TRANSDERMAL at 09:05

## 2020-05-13 RX ADMIN — LIDOCAINE HYDROCHLORIDE 100 MG: 20 INJECTION, SOLUTION INTRAVENOUS at 09:05

## 2020-05-13 RX ADMIN — MUPIROCIN: 20 OINTMENT TOPICAL at 08:05

## 2020-05-13 RX ADMIN — ONDANSETRON 4 MG: 2 INJECTION INTRAMUSCULAR; INTRAVENOUS at 10:05

## 2020-05-13 RX ADMIN — CEFAZOLIN 2 G: 330 INJECTION, POWDER, FOR SOLUTION INTRAMUSCULAR; INTRAVENOUS at 09:05

## 2020-05-13 RX ADMIN — CELECOXIB 400 MG: 200 CAPSULE ORAL at 08:05

## 2020-05-13 RX ADMIN — FAMOTIDINE 20 MG: 10 INJECTION, SOLUTION INTRAVENOUS at 10:05

## 2020-05-13 NOTE — ANESTHESIA PREPROCEDURE EVALUATION
05/13/2020  Kelby Finley is a 45 y.o., male.    Anesthesia Evaluation    I have reviewed the Patient Summary Reports.    I have reviewed the Nursing Notes.   I have reviewed the Medications.     Review of Systems      Physical Exam  General:  Well nourished    Airway/Jaw/Neck:  Airway Findings: Mouth Opening: Normal Tongue: Normal  General Airway Assessment: Average  Mallampati: III  Improves to II with phonation.  TM Distance: Normal, at least 6 cm  Jaw/Neck Findings:  Neck ROM: Normal ROM            Mental Status:  Mental Status Findings:  Alert and Oriented       Patient Active Problem List   Diagnosis    GERD (gastroesophageal reflux disease)    History of Peptic ulcer    Chronic left shoulder pain    H/O trauma    Mixed hypertriglyceridemia    Primary insomnia    Severe obesity (BMI 35.0-39.9) with comorbidity    Essential hypertension    Open fracture of middle phalanx of right index finger         Anesthesia Plan  Type of Anesthesia, risks & benefits discussed:  Anesthesia Type:  general, MAC, regional  Patient's Preference:   Intra-op Monitoring Plan: standard ASA monitors  Intra-op Monitoring Plan Comments:   Post Op Pain Control Plan:   Post Op Pain Control Plan Comments:   Induction:   IV  Beta Blocker:  Patient is not currently on a Beta-Blocker (No further documentation required).       Informed Consent: Patient understands risks and agrees with Anesthesia plan.  Questions answered. Anesthesia consent signed with patient.  ASA Score: 3     Day of Surgery Review of History & Physical:    H&P update referred to the surgeon.         Ready For Surgery From Anesthesia Perspective.

## 2020-05-13 NOTE — PLAN OF CARE
Contacted patient to review the Covid-19 Procedure/Surgery Confirmation questionnaire.  Patient receptive to questions and all information provided on instructions concerning our temporary temperature, hand washing/sanitizing and visitor policy as per CDC recommendations. No visitors will be allowed into the hospital at this time.  Patient will be dropped off and picked up at the same location.  We will receive permission to text that person with updates,(if patient wishes to do so) as well as when the patient is ready for discharge.  Phone number [phone] provided for patients to call if they were to develop fever, cough or SOB prior to surgery. Instructed to take temperature the night before and the morning of surgery. Travel questions as per travel questionnaire reviewed. Our cleaning protocols for every surgery and every procedure were reviewed, and reassurance provided that safety, as well as following CDC guidelines, are our top priority. Voiced understanding.    Arrival time of 815am given for surgery tomorrow at [facility], [address] [place] [address], 1st floor [facility]. Instructions given on food/fluid intake, medications, COVID info and visitor policy.  Voiced understanding

## 2020-05-13 NOTE — ANESTHESIA PROCEDURE NOTES
Supra    Patient location during procedure: pre-op   Block not for primary anesthetic.  Reason for block: at surgeon's request and post-op pain management   Post-op Pain Location: right hand  Start time: 5/13/2020 9:10 AM  Timeout: 5/13/2020 9:09 AM   End time: 5/13/2020 9:15 AM    Staffing  Authorizing Provider: Rodrigo Prabhakar MD  Performing Provider: Rodrigo Prabhakar MD    Preanesthetic Checklist  Completed: patient identified, site marked, surgical consent, pre-op evaluation, timeout performed, IV checked, risks and benefits discussed and monitors and equipment checked  Peripheral Block  Patient position: supine  Prep: ChloraPrep  Patient monitoring: heart rate, cardiac monitor, continuous pulse ox, continuous capnometry and frequent blood pressure checks  Block type: supraclavicular  Laterality: right  Injection technique: single shot  Needle  Needle type: Stimuplex   Needle gauge: 22 G  Needle length: 2 in  Needle localization: anatomical landmarks and ultrasound guidance   -ultrasound image captured on disc.  Assessment  Injection assessment: negative aspiration, negative parasthesia and local visualized surrounding nerve  Paresthesia pain: none  Heart rate change: no  Slow fractionated injection: yes  Additional Notes  VSS.  [facility] RN monitoring vitals throughout procedure.  Patient tolerated procedure well.

## 2020-05-13 NOTE — TRANSFER OF CARE
"Anesthesia Transfer of Care Note    Patient: Kelby Finley    Procedure(s) Performed: Procedure(s) (LRB):  ORIF, FINGER- right index finger P2 ORIF with poss ext tendon repair, need mini c arm, accumed hand set, and 4-0 Fiberwire (Right)  FLUOROSCOPY (Right)  IRRIGATION AND DEBRIDEMENT (Right)  EXCISION, LESION, TENDON (Right)    Patient location: PACU    Anesthesia Type: general    Transport from OR: Transported from OR on 6-10 L/min O2 by face mask with adequate spontaneous ventilation    Post pain: adequate analgesia    Post assessment: no apparent anesthetic complications    Post vital signs: stable    Level of consciousness: awake    Nausea/Vomiting: no nausea/vomiting    Complications: none    Transfer of care protocol was followed      Last vitals:   Visit Vitals  /69   Pulse 80   Temp 37.1 °C (98.8 °F) (Oral)   Resp (!) 32   Ht 5' 11" (1.803 m)   Wt 122.5 kg (270 lb)   SpO2 99%   BMI 37.66 kg/m²     "

## 2020-05-13 NOTE — PROGRESS NOTES
Spoke with Dr. Prabhakar regarding pt's order for Celebrex 400mg. Pt has no recent labs. Per Dr. Prabhakar, ok for pt to take ordered dose.

## 2020-05-13 NOTE — PLAN OF CARE
VSS. Pt denies c/o pain to incision site. Sling intact. Pt able to tolerate oral liquids. D/C instructions reviewed with pt, verbalized understanding. Written prescriptions give for post-op meds. No distress noted.

## 2020-05-13 NOTE — BRIEF OP NOTE
[facility]  Brief Operative Note    Surgery Date: 5/13/2020     Surgeon(s) and Role:     * Randal Odom MD - Primary    Assisting Surgeon: None    Pre-op Diagnosis:  Open displaced fracture of middle phalanx of right index finger, initial encounter [S62.620B]  Extensor tendon laceration of finger with open wound, initial encounter [S56.429A, S61.209A]    Post-op Diagnosis:  Post-Op Diagnosis Codes:     * Open displaced fracture of middle phalanx of right index finger, initial encounter [S62.620B]     * Extensor tendon laceration of finger with open wound, initial encounter [S56.429A, S61.209A]    Procedure(s) (LRB):  ORIF, FINGER- right index finger P2 ORIF with poss ext tendon repair, need mini c arm, accumed hand set, and 4-0 Fiberwire (Right)  FLUOROSCOPY (Right)  IRRIGATION AND DEBRIDEMENT (Right)  EXCISION, LESION, TENDON (Right)    Anesthesia: Local MAC    Description of the findings of the procedure(s): Right P2 index finger ORIF with extensor tendon repair, Ex-fix placement spanning P1-P3 of Right index finger and distal radius bone graft harvest    Estimated Blood Loss: * No values recorded between 5/13/2020  9:57 AM and 5/13/2020 12:54 PM *         Specimens:   Specimen (12h ago, onward)    None            Discharge Note    OUTCOME: Patient tolerated treatment/procedure well without complication and is now ready for discharge.    DISPOSITION: Home or Self Care    FINAL DIAGNOSIS:  Open fracture of middle phalanx of right index finger    FOLLOWUP: In clinic    DISCHARGE INSTRUCTIONS:  No discharge procedures on file.

## 2020-05-13 NOTE — INTERVAL H&P NOTE
The patient has been examined and the H&P has been reviewed:    I concur with the findings and no changes have occurred since H&P was written.    Anesthesia/Surgery risks, benefits and alternative options discussed and understood by patient/family.          Active Hospital Problems    Diagnosis  POA    Open fracture of middle phalanx of right index finger [M00.763C]  Yes      Resolved Hospital Problems   No resolved problems to display.

## 2020-05-14 NOTE — ANESTHESIA POSTPROCEDURE EVALUATION
Anesthesia Post Evaluation    Patient: Kelby Finley    Procedure(s) Performed: Procedure(s) (LRB):  ORIF, FINGER- right index finger P2 ORIF with poss ext tendon repair, need mini c arm, accumed hand set, and 4-0 Fiberwire (Right)  FLUOROSCOPY (Right)  IRRIGATION AND DEBRIDEMENT (Right)  EXCISION, LESION, TENDON (Right)    Final Anesthesia Type: general    Patient location during evaluation: PACU  Patient participation: Yes- Able to Participate  Level of consciousness: awake and alert and oriented  Post-procedure vital signs: reviewed and stable  Pain management: adequate  Airway patency: patent    PONV status at discharge: No PONV  Anesthetic complications: no      Cardiovascular status: stable  Respiratory status: unassisted  Hydration status: euvolemic  Follow-up not needed.          Vitals Value Taken Time   /76 5/13/2020  1:32 PM   Temp 36.5 °C (97.7 °F) 5/13/2020  1:39 PM   Pulse 72 5/13/2020  1:44 PM   Resp 24 5/13/2020  1:44 PM   SpO2 97 % 5/13/2020  1:41 PM   Vitals shown include unvalidated device data.      Event Time     Out of Recovery 13:35:00          Pain/Radha Score: Pain Rating Prior to Med Admin: 1 (5/13/2020  8:45 AM)  Pain Rating Post Med Admin: 0 (5/13/2020  9:10 AM)  Radha Score: 9 (5/13/2020  1:15 PM)

## 2020-05-15 NOTE — OP NOTE
DATE OF PROCEDURE: 5/13/20     COVID-19 attestation:  Due to the nature of this patient's condition and/or the presence of pain, debilitty, and/or dysfunction, proceeding with surgery was indicated.  Furthermore, this patient was treated during the COVID-19 pandemic.  This was discussed with the patient, they are aware of our current policies and procedures, were given the option of delaying their surgery when applicable and if acceptable, and they elect to proceed.  Delaying this patient's surgery greater than 30 days would be detrimental to their care and functional outcome and/or cause additional suffering, pain, discomfort, and/or dysfunction/debility.  This was confirmed and we thus proceeded.      SERVICE:  Orthopedic Surgery.     SURGEON:  Randal Odom M.D.     FIRST ASSISTANT:  Estrella Ulrich MD     PREOPERATIVE DIAGNOSIS:    1) Power tool injury right index finger middle phalanx  2) Open fracture right index finger middle phalanx with extensive bone loss  3) Extensor tendon disruption right index finger, zone 2     POSTOPERATIVE DIAGNOSIS:    4) Power tool injury right index finger middle phalanx  5) Open fracture right index finger middle phalanx with extensive bone loss  6) Extensor tendon disruption right index finger, zone 2     PROCEDURE(S) PERFORMED:    1) Irrigation and excisional debridement of open fracture right index finger middle phalanx with excision of skin, subcutaneous tissue, tendon, and bone  2) Open reduction and internal fixation right index finger middle phalanx with autologous bone grafting and placement of an external fixator for additional stability and hybrid fixation  3) Distal radius autograft bone harvest and placement at the site of extensive bone loss right index finger middle phalanx  4) Right index finger extensor tendon primary repair, zone 2     TOURNIQUET TIME:  120 minutes at 250mmHg.     ESTIMATED BLOOD LOSS:  5 mL.     IMPLANTS:     1) Accu Med 4 hole 1.5 mm  plate with a combination of 3 cortical and locking screws  2) Accu Med mini ex fix right index finger     COMPLICATIONS:  None.     PACKS AND DRAINS:  None.     PATHOLOGIC SPECIMENS:  None.    ANESTHESIA:  Regional mac     IV FLUIDS: Crystalloid.     CONDITION:  Stable.    MICROBIOLOGY: None.    Indications for Procedure:     The patient is a 45-year-old male who previously injured his right index finger with a router.  He was found to have an open fracture of the right index finger middle phalanx with suspected extensor tendon disruption.  He had significant bone loss to his right index finger middle phalanx.  The patient has not responded to adequate non operative treatment at this time and/or non operative treatment is not indicated. Thus, the risks, benefits and alternatives to surgery were discussed with the patient in detail.  Specific risks include but are not limited to bleeding, infection, vessel and/or nerve damage, pain, numbness, tingling, compartment syndrome, need for additional surgery, failure to return to pre-injury and/or preoperative functional status, inability to return to work, scar sensitivity, delayed healing, complex regional pain syndrome, weakness, pulley injury, tendon injury, bowstringing, partial and/or incomplete relief of symptoms, persistence of and/or worsening of symptoms, hardware and/or surgical failure, prominent and/or symptomatic hardware possibly necessitating future removal, osteomyelitis, amputation, loss of function, stiffness, rotational malalignment, functional debility, dysfunction, decreased  strength, need for prolonged postoperative rehabilitation, malunion, nonunion, deep venous thrombosis, pulmonary embolism, arthritis and death.  The patient states an understanding and wishes to proceed with surgery.   All questions were answered.  No guarantees were implied or stated.  Written informed consent was obtained.     Procedure in detail:    On the date of the  operative intervention, the patient was evaluated in the preoperative holding area.  With their participation the right upper extremity was marked at the operative site.   The patient was then administered regional anesthesia and taken to the operating room where they were placed on OR table.  The right upper extremity extremity was then placed on a hand table with a nonsterile tourniquet placed high on the patient's right upper arm.  The right upper extremity extremity was then prepped and draped in the usual sterile fashion and time-out was taken and confirmed by all present to confirm the correct patient site procedure and administration of preoperative antibiotics if ordered.  All were in agreement so I proceeded.  Esmarch was utilized to exsanguinate the patient's right upper extremity and tourniquet was then insufflated to 250 mm of mercury.  I then surveyed the patient's right index finger and his wound.  He had an extensile oblique laceration extending from the dorsum of the right index finger at the level of the DIP joint with extension to the radial mid axial line approaching the proximal phalanx.  Previously placed sutures were removed and the laceration was explored.  It was immediately evident that it probed to the middle phalanx and there was significant bone loss at this time.  I identified the proximal and distal stumps of the extensor tendon which was noted to be completely lacerated in zone 2.  I extended the sharp laceration proximally and distally with mid axial flaps and dissected down to the level of the proximal middle and distal phalanges.  I was able to identify the radial neurovascular bundle to the index finger volar to my incision and found the radial digital nerve as well as the radial digital artery to be intact at this time.  I next irrigated the wound with copious amounts of sterile saline and performed sharp excisional debridement of necrotic skin and subcutaneous tissue as well as  small fracture fragments which were devitalized of all soft tissues and unable to be salvaged.  These were placed in a specimen cup for later bone grafting.  I identified the flexor tendon at the deep aspect of the wound through the area of bone loss to the middle phalanx.  I was able to identify both the FDS and FDP tendons which were found to be without injury.  I then turned my attention towards fixation of the patient's fracture.  It was quite evident that significant bone graft would be necessary and I then made an approximately 3 cm incision over the patient's right dorsal distal radius.  Skin incision was made sharply and dissection was carried down through subcutaneous tissues protecting the radial sensory nerve branches.  The extensor retinaculum was identified and entered through the 3rd dorsal compartment.  Subperiosteal dissection was continued into the 4th dorsal compartment and the dorsal radius was thus exposed.  I then procured adequate corticocancellous distal radius bone graft at this time taking great care to transposed and protect the EPL and all extensor tendons throughout.  After I had at harvested adequate bone grafting I then placed a moist Ray-Natan in the field.  I then packed the area of bone loss in the middle phalanx with the patient is autograft bone and filled the defect completely.  I then chose and placed a 4 hole 1.5 mm Accu Med plate at the radial lateral aspect of the middle phalanx and secured this with 2 screws distally.  There is only real estate for 1 screw proximally which I did capture the base of the intact middle phalanx with good stability and a locking screw, however the fixation was inadequate by itself at this time and I decided to augment my fixation with an external fixator to the right index finger.  Prior to placing the external fixator I then performed my extensor tendon repair with 4 0 FiberWire suture.  Excellent repair was achieved after the tendon edges were  debrided of nonviable and poor quality tendon at its edges.  Two K-wires were placed at the radial aspect of the distal phalanx and then similarly at the radial aspect of the proximal phalanx.  These were then connected via the [employer] mini external fixator device and there was excellent stability achieved at this time.  The ex fix was then locked.  Fluoroscopy was utilized throughout the duration of the case and at this time I took my final fluoroscopic x-rays which noted good reduction and good fill of the area of bone loss.  Stability of the index finger was excellent with my hybrid fixation.  I then turned my attention toward the bone graft harvest site.  All remaining bone graft was replaced back into the harvest site which had good fill.  The extensor retinaculum was closed with 3 0 Vicryl suture leaving the EPL transposed dorsally.  Three 0 Vicryl was also utilized to close the subdermal tissues followed by 4 0 nylon to close the skin.  Attention was then turned towards completion of the procedure.  The wound was once again irrigated with copious amounts of sterile saline and then the patient is laceration and wounds were closed with 4 0 nylon suture.  There was excellent coverage of all bony and tendinous structures.  At this time the tourniquet was deflated at which point brisk capillary refill in [place] throughout the patient's right upper extremity and specifically to the index finger.  Sterile dressings were then applied consisting of Xeroform 4 x 4 gauze and a radial gutter splint to the right upper extremity.  The patient was then awakened from anesthesia and returned to the post anesthesia care unit stable condition.  There were no complications and has attending surgeon I was present and performed the entire procedure.    Postoperative plan for the patient:  The patient will be discharged home in stable condition.  I will see him back in approximately 2 weeks for suture removal and re-evaluation of  his postoperative plan.     Please be aware that this note has been generated with the assistance of [employer] voice-to-text.  Please excuse any spelling or grammatical errors.

## 2020-05-25 DIAGNOSIS — T14.8XXA FRACTURE: Primary | ICD-10-CM

## 2020-05-26 ENCOUNTER — HOSPITAL ENCOUNTER (OUTPATIENT)
Dept: RADIOLOGY | Facility: HOSPITAL | Age: 45
Discharge: HOME | End: 2020-05-26
Attending: ORTHOPAEDIC SURGERY
Payer: COMMERCIAL

## 2020-05-26 ENCOUNTER — NURSE TRIAGE (OUTPATIENT)
Dept: ADMINISTRATIVE | Facility: CLINIC | Age: 45
End: 2020-05-26

## 2020-05-26 ENCOUNTER — OFFICE VISIT (OUTPATIENT)
Dept: ORTHOPEDICS | Facility: CLINIC | Age: 45
End: 2020-05-26
Payer: COMMERCIAL

## 2020-05-26 VITALS
HEIGHT: 71 IN | WEIGHT: 270 LBS | HEART RATE: 72 BPM | BODY MASS INDEX: 37.8 KG/M2 | DIASTOLIC BLOOD PRESSURE: 74 MMHG | SYSTOLIC BLOOD PRESSURE: 127 MMHG

## 2020-05-26 DIAGNOSIS — T14.8XXA FRACTURE: ICD-10-CM

## 2020-05-26 DIAGNOSIS — S62.620B OPEN DISPLACED FRACTURE OF MIDDLE PHALANX OF RIGHT INDEX FINGER, INITIAL ENCOUNTER: Primary | ICD-10-CM

## 2020-05-26 PROCEDURE — 99024 POSTOP FOLLOW-UP VISIT: CPT | Mod: S$GLB,,, | Performed by: ORTHOPAEDIC SURGERY

## 2020-05-26 PROCEDURE — 73140 XR FINGER 2 OR MORE VIEWS: ICD-10-PCS | Mod: 26,RT,, | Performed by: RADIOLOGY

## 2020-05-26 PROCEDURE — 99999 PR PBB SHADOW E&M-EST. PATIENT-LVL III: CPT | Mod: PBBFAC,,, | Performed by: ORTHOPAEDIC SURGERY

## 2020-05-26 PROCEDURE — 99024 PR POST-OP FOLLOW-UP VISIT: ICD-10-PCS | Mod: S$GLB,,, | Performed by: ORTHOPAEDIC SURGERY

## 2020-05-26 PROCEDURE — 99999 PR PBB SHADOW E&M-EST. PATIENT-LVL III: ICD-10-PCS | Mod: PBBFAC,,, | Performed by: ORTHOPAEDIC SURGERY

## 2020-05-26 PROCEDURE — 73140 X-RAY EXAM OF FINGER(S): CPT | Mod: 26,RT,, | Performed by: RADIOLOGY

## 2020-05-26 PROCEDURE — 73140 X-RAY EXAM OF FINGER(S): CPT | Mod: TC

## 2020-05-26 RX ORDER — SULFAMETHOXAZOLE AND TRIMETHOPRIM 800; 160 MG/1; MG/1
1 TABLET ORAL 2 TIMES DAILY
Qty: 28 TABLET | Refills: 0 | Status: SHIPPED | OUTPATIENT
Start: 2020-05-26 | End: 2020-06-24

## 2020-05-26 RX ORDER — OXYCODONE AND ACETAMINOPHEN 5; 325 MG/1; MG/1
1 TABLET ORAL EVERY 4 HOURS PRN
Qty: 42 TABLET | Refills: 0 | Status: SHIPPED | OUTPATIENT
Start: 2020-05-26 | End: 2021-01-08

## 2020-05-26 NOTE — PROGRESS NOTES
Kelby Finley presents for post-operative evaluation.  The patient is now 2 weeks s/p ORIF/external fixation right IF with extensor tendon repair.  Overall the patient reports doing well.  The patient reports appropriate postoperative soreness with well controlled overall pain.  He denies any N/T.  Currently taking Bactrim.    PE:    AA&O x 4.  NAD  HEENT:  NCAT, sclera nonicteric  Lungs:  Respirations are equal and unlabored.  CV:  2+ bilateral upper and lower extremity pulses.  MSK: The wound is healing well with no signs of erythema or warmth.  There is minimal serous drainage.  No clinical signs or symptoms of infection are present.  Sutures left intact today.  Ex fix intact.  LTS intact to the IF.    Imaging status postoperative fixation of right index finger middle phalanx fracture with bone grafting with no evidence of any complication    A/P: Status post above, doing well  1) Continue with non weight bearing.  Patient placed in finger splint to the index finger and advised that he may perform range of motion as tolerated to the thumb and remainder of the hand as well as unrestricted wrist range of motion as tolerated.  2) F/U 1 weeks for wound check and suture removal.  Will continue Bactrim.  Patient understands that the primary goal of his surgery was to maintain an of bone stock to allow for future arthrodesis should that become necessary.  He understands that he will have stiffness in the index finger.  The pending on his progress and or result we discussed that he may necessitate an amputation in the future depending on the functional recovery of his index finger.  3) Call with any questions/concerns in the interim     Please be aware that this note has been generated with the assistance of [facility] voice-to-text.  Please excuse any spelling or grammatical errors.

## 2020-05-26 NOTE — TELEPHONE ENCOUNTER
Called patient on behalf of [facility]. Pt denied developing any fever, cough or shortness of breath since the procedure.    Reason for Disposition   [1] Follow-up call to recent contact AND [2] information only call, no triage required    Additional Information   Negative: [1] Caller is not with the adult (patient) AND [2] reporting urgent symptoms   Negative: Lab result questions   Negative: Medication questions   Negative: Caller can't be reached by phone   Negative: Caller has already spoken to PCP or another triager   Negative: RN needs further essential information from caller in order to complete triage   Negative: Requesting regular office appointment   Negative: [1] Caller requesting NON-URGENT health information AND [2] PCP's office is the best resource   Negative: Health Information question, no triage required and triager able to answer question   Negative: General information question, no triage required and triager able to answer question   Negative: Question about upcoming scheduled test, no triage required and triager able to answer question   Negative: [1] Caller is not with the adult (patient) AND [2] probable NON-URGENT symptoms    Protocols used: INFORMATION ONLY CALL-A-

## 2020-06-01 ENCOUNTER — HOSPITAL ENCOUNTER (OUTPATIENT)
Dept: RADIOLOGY | Facility: OTHER | Age: 45
Discharge: HOME | End: 2020-06-01
Attending: ORTHOPAEDIC SURGERY
Payer: COMMERCIAL

## 2020-06-01 ENCOUNTER — OFFICE VISIT (OUTPATIENT)
Dept: ORTHOPEDICS | Facility: CLINIC | Age: 45
End: 2020-06-01
Payer: COMMERCIAL

## 2020-06-01 VITALS
DIASTOLIC BLOOD PRESSURE: 97 MMHG | HEART RATE: 73 BPM | SYSTOLIC BLOOD PRESSURE: 148 MMHG | HEIGHT: 71 IN | BODY MASS INDEX: 37.8 KG/M2 | WEIGHT: 270 LBS

## 2020-06-01 DIAGNOSIS — S62.620B OPEN DISPLACED FRACTURE OF MIDDLE PHALANX OF RIGHT INDEX FINGER, INITIAL ENCOUNTER: Primary | ICD-10-CM

## 2020-06-01 DIAGNOSIS — S62.620B OPEN DISPLACED FRACTURE OF MIDDLE PHALANX OF RIGHT INDEX FINGER, INITIAL ENCOUNTER: ICD-10-CM

## 2020-06-01 PROCEDURE — 99024 PR POST-OP FOLLOW-UP VISIT: ICD-10-PCS | Mod: S$GLB,,, | Performed by: ORTHOPAEDIC SURGERY

## 2020-06-01 PROCEDURE — 73140 X-RAY EXAM OF FINGER(S): CPT | Mod: 26,RT,, | Performed by: RADIOLOGY

## 2020-06-01 PROCEDURE — 73140 X-RAY EXAM OF FINGER(S): CPT | Mod: TC,FY

## 2020-06-01 PROCEDURE — 99024 POSTOP FOLLOW-UP VISIT: CPT | Mod: S$GLB,,, | Performed by: ORTHOPAEDIC SURGERY

## 2020-06-01 PROCEDURE — 99999 PR PBB SHADOW E&M-EST. PATIENT-LVL III: CPT | Mod: PBBFAC,,, | Performed by: ORTHOPAEDIC SURGERY

## 2020-06-01 PROCEDURE — 73140 XR FINGER 2 OR MORE VIEWS: ICD-10-PCS | Mod: 26,RT,, | Performed by: RADIOLOGY

## 2020-06-01 PROCEDURE — 99999 PR PBB SHADOW E&M-EST. PATIENT-LVL III: ICD-10-PCS | Mod: PBBFAC,,, | Performed by: ORTHOPAEDIC SURGERY

## 2020-06-01 NOTE — PROGRESS NOTES
Kelby Finley presents for post-operative evaluation.  The patient is now 3 weeks s/p ORIF/external fixation right IF with extensor tendon repair.  Overall the patient reports doing well.  The patient reports appropriate postoperative soreness with well controlled overall pain.  He denies any N/T.  Currently taking Bactrim and has >1 week left per his report.    PE:    AA&O x 4.  NAD  HEENT:  NCAT, sclera nonicteric  Lungs:  Respirations are equal and unlabored.  CV:  2+ bilateral upper and lower extremity pulses.  MSK: The wound is healing well with no signs of erythema or warmth.  There is no drainage today.  No clinical signs or symptoms of infection are present.  Sutures left intact today.  Ex fix intact.  LTS intact to the IF.    Imaging status postoperative fixation of right index finger middle phalanx fracture with bone grafting with no evidence of any complication; evidence of some early consolidation    A/P: Status post above, doing well  1) Continue with non weight bearing.  Patient placed back in finger splint to the index finger and advised that he may perform range of motion as tolerated to the thumb and remainder of the hand as well as unrestricted wrist range of motion as tolerated.  2) F/U 3 weeks for wound check and new xrays of the right index finger.  Finish Bactrim.  Patient understands that the primary goal of his surgery was to maintain an of bone stock to allow for future arthrodesis should that become necessary.  He understands that he will have stiffness in the index finger.  The pending on his progress and or result we discussed that he may necessitate an amputation in the future depending on the functional recovery of his index finger.  3) Call with any questions/concerns in the interim     Please be aware that this note has been generated with the assistance of [employer] voice-to-text.  Please excuse any spelling or grammatical errors.

## 2020-06-18 DIAGNOSIS — Z98.890 POST-OPERATIVE STATE: Primary | ICD-10-CM

## 2020-06-23 ENCOUNTER — HOSPITAL ENCOUNTER (OUTPATIENT)
Dept: RADIOLOGY | Facility: HOSPITAL | Age: 45
Discharge: HOME | End: 2020-06-23
Attending: ORTHOPAEDIC SURGERY
Payer: COMMERCIAL

## 2020-06-23 ENCOUNTER — OFFICE VISIT (OUTPATIENT)
Dept: ORTHOPEDICS | Facility: CLINIC | Age: 45
End: 2020-06-23
Payer: COMMERCIAL

## 2020-06-23 VITALS — SYSTOLIC BLOOD PRESSURE: 125 MMHG | DIASTOLIC BLOOD PRESSURE: 87 MMHG | HEART RATE: 67 BPM

## 2020-06-23 DIAGNOSIS — S62.620B OPEN DISPLACED FRACTURE OF MIDDLE PHALANX OF RIGHT INDEX FINGER, INITIAL ENCOUNTER: ICD-10-CM

## 2020-06-23 DIAGNOSIS — Z98.890 POST-OPERATIVE STATE: ICD-10-CM

## 2020-06-23 DIAGNOSIS — Z01.818 PREOPERATIVE CLEARANCE: Primary | ICD-10-CM

## 2020-06-23 PROCEDURE — 99999 PR PBB SHADOW E&M-EST. PATIENT-LVL III: CPT | Mod: PBBFAC,,, | Performed by: ORTHOPAEDIC SURGERY

## 2020-06-23 PROCEDURE — 73140 X-RAY EXAM OF FINGER(S): CPT | Mod: TC,RT

## 2020-06-23 PROCEDURE — 99024 POSTOP FOLLOW-UP VISIT: CPT | Mod: S$GLB,,, | Performed by: ORTHOPAEDIC SURGERY

## 2020-06-23 PROCEDURE — 99999 PR PBB SHADOW E&M-EST. PATIENT-LVL III: ICD-10-PCS | Mod: PBBFAC,,, | Performed by: ORTHOPAEDIC SURGERY

## 2020-06-23 PROCEDURE — 73140 XR FINGER 2 OR MORE VIEWS RIGHT: ICD-10-PCS | Mod: 26,RT,, | Performed by: RADIOLOGY

## 2020-06-23 PROCEDURE — 73140 X-RAY EXAM OF FINGER(S): CPT | Mod: 26,RT,, | Performed by: RADIOLOGY

## 2020-06-23 PROCEDURE — 99024 PR POST-OP FOLLOW-UP VISIT: ICD-10-PCS | Mod: S$GLB,,, | Performed by: ORTHOPAEDIC SURGERY

## 2020-06-23 NOTE — PROGRESS NOTES
Kelby Finley presents for post-operative evaluation.  The patient is now 6 weeks s/p ORIF/external fixation right IF with extensor tendon repair.  Overall the patient reports doing well.  The patient reports appropriate postoperative soreness with well controlled overall pain.  He denies any N/T.  Has completed abx.  No drainage.    PE:    AA&O x 4.  NAD  HEENT:  NCAT, sclera nonicteric  Lungs:  Respirations are equal and unlabored.  CV:  2+ bilateral upper and lower extremity pulses.  MSK: The wound is healing well with no signs of erythema or warmth.  There is no drainage today.  No clinical signs or symptoms of infection are present.  Ex fix intact.  LTS intact to the IF.  Finger ROM not tested.  Wrist ROM back to baseline s/p remove 4CA.    Imaging status postoperative fixation of right index finger middle phalanx fracture with bone grafting with no evidence of any complication; evidence of some early consolidation    A/P: Status post above, doing well  1) Continue with non weight bearing.  Ex fix removed.  Placed in finger splint.  2) F/U 6 weeks for new xrays of the right index finger.    3) Call with any questions/concerns in the interim     Please be aware that this note has been generated with the assistance of [facility] voice-to-text.  Please excuse any spelling or grammatical errors.

## 2020-06-25 RX ORDER — HYDROCODONE BITARTRATE AND ACETAMINOPHEN 7.5; 325 MG/1; MG/1
1 TABLET ORAL EVERY 6 HOURS PRN
Qty: 28 TABLET | Refills: 0 | Status: SHIPPED | OUTPATIENT
Start: 2020-06-25 | End: 2020-07-02

## 2020-07-31 DIAGNOSIS — Z98.890 POST-OPERATIVE STATE: Primary | ICD-10-CM

## 2020-08-06 ENCOUNTER — HOSPITAL ENCOUNTER (OUTPATIENT)
Dept: RADIOLOGY | Facility: HOSPITAL | Age: 45
Discharge: HOME | End: 2020-08-06
Attending: ORTHOPAEDIC SURGERY
Payer: COMMERCIAL

## 2020-08-06 ENCOUNTER — OFFICE VISIT (OUTPATIENT)
Dept: ORTHOPEDICS | Facility: CLINIC | Age: 45
End: 2020-08-06
Payer: COMMERCIAL

## 2020-08-06 DIAGNOSIS — S62.620B OPEN DISPLACED FRACTURE OF MIDDLE PHALANX OF RIGHT INDEX FINGER, INITIAL ENCOUNTER: Primary | ICD-10-CM

## 2020-08-06 DIAGNOSIS — Z98.890 POST-OPERATIVE STATE: ICD-10-CM

## 2020-08-06 PROCEDURE — 99024 POSTOP FOLLOW-UP VISIT: CPT | Mod: S$GLB,,, | Performed by: ORTHOPAEDIC SURGERY

## 2020-08-06 PROCEDURE — 99999 PR PBB SHADOW E&M-EST. PATIENT-LVL I: CPT | Mod: PBBFAC,,, | Performed by: ORTHOPAEDIC SURGERY

## 2020-08-06 PROCEDURE — 73140 XR FINGER 2 OR MORE VIEWS RIGHT: ICD-10-PCS | Mod: 26,RT,, | Performed by: RADIOLOGY

## 2020-08-06 PROCEDURE — 73140 X-RAY EXAM OF FINGER(S): CPT | Mod: TC,RT

## 2020-08-06 PROCEDURE — 73140 X-RAY EXAM OF FINGER(S): CPT | Mod: 26,RT,, | Performed by: RADIOLOGY

## 2020-08-06 PROCEDURE — 99999 PR PBB SHADOW E&M-EST. PATIENT-LVL I: ICD-10-PCS | Mod: PBBFAC,,, | Performed by: ORTHOPAEDIC SURGERY

## 2020-08-06 PROCEDURE — 99024 PR POST-OP FOLLOW-UP VISIT: ICD-10-PCS | Mod: S$GLB,,, | Performed by: ORTHOPAEDIC SURGERY

## 2020-08-06 NOTE — PROGRESS NOTES
Kelby Finley presents for post-operative evaluation.  The patient is now 3 mos s/p ORIF/external fixation right IF with extensor tendon repair.  Overall the patient reports doing well.  The patient reports appropriate postoperative soreness with well controlled overall pain.  He denies any N/T.  Has completed abx.  No drainage.  He has been wearing his splint but he is also back at work and doing quite well there.  He does note some chronic right wrist pain because he has had a prior four-corner arthrodesis.  No new complaints today.  He notes that he has some range of motion at his IP joints.    PE:    AA&O x 4.  NAD  HEENT:  NCAT, sclera nonicteric  Lungs:  Respirations are equal and unlabored.  CV:  2+ bilateral upper and lower extremity pulses.  MSK: The wound is healing well with no signs of erythema or warmth.  There is no drainage today.  No clinical signs or symptoms of infection are present.  LTS intact to the IF.  Swelling dramatically improved from last visit.  The patient has from 0-25 degrees range of motion at the PIP joint and from 0-10 at the DIP joint.  No evidence of any infection.  Wound completely healed..  Wrist ROM back to baseline s/p remove 4CA.    Imaging status postoperative fixation of right index finger middle phalanx fracture with bone grafting with no evidence of any complication; evidence of significant progression of bone graft consolidation and healing    A/P: Status post above, doing well  1) the patient may now begin range of motion as tolerated to the right index finger.  Occupational therapy referral sent.  Discontinue splint and wrapping.  The patient is doing much better now.  We did discuss that should the finger not be functional and or get in the way or have any setbacks we can always consider amputation but for now it looks like that will be avoidable.  2) F/U 3 months for new xrays of the right index finger and right wrist.    3) Call with any questions/concerns in the  interim     Please be aware that this note has been generated with the assistance of [employer] voice-to-text.  Please excuse any spelling or grammatical errors.

## 2020-08-13 ENCOUNTER — TELEPHONE (OUTPATIENT)
Dept: ORTHOPEDICS | Facility: CLINIC | Age: 45
End: 2020-08-13

## 2020-11-09 DIAGNOSIS — M25.531 RIGHT WRIST PAIN: ICD-10-CM

## 2020-11-09 DIAGNOSIS — S62.620B OPEN DISPLACED FRACTURE OF MIDDLE PHALANX OF RIGHT INDEX FINGER, INITIAL ENCOUNTER: Primary | ICD-10-CM

## 2020-11-10 ENCOUNTER — HOSPITAL ENCOUNTER (OUTPATIENT)
Dept: RADIOLOGY | Facility: HOSPITAL | Age: 45
Discharge: HOME | End: 2020-11-10
Attending: ORTHOPAEDIC SURGERY
Payer: COMMERCIAL

## 2020-11-10 ENCOUNTER — OFFICE VISIT (OUTPATIENT)
Dept: ORTHOPEDICS | Facility: CLINIC | Age: 45
End: 2020-11-10
Payer: COMMERCIAL

## 2020-11-10 DIAGNOSIS — M25.531 RIGHT WRIST PAIN: ICD-10-CM

## 2020-11-10 DIAGNOSIS — S62.620B OPEN DISPLACED FRACTURE OF MIDDLE PHALANX OF RIGHT INDEX FINGER, INITIAL ENCOUNTER: ICD-10-CM

## 2020-11-10 DIAGNOSIS — M25.531 RIGHT WRIST PAIN: Primary | ICD-10-CM

## 2020-11-10 PROCEDURE — 20605 INTERMEDIATE JOINT ASPIRATION/INJECTION: R RADIOCARPAL: ICD-10-PCS | Mod: RT,S$GLB,, | Performed by: ORTHOPAEDIC SURGERY

## 2020-11-10 PROCEDURE — 73140 X-RAY EXAM OF FINGER(S): CPT | Mod: TC,RT

## 2020-11-10 PROCEDURE — 73110 X-RAY EXAM OF WRIST: CPT | Mod: 26,RT,, | Performed by: RADIOLOGY

## 2020-11-10 PROCEDURE — 73110 XR WRIST COMPLETE 3 VIEWS RIGHT: ICD-10-PCS | Mod: 26,RT,, | Performed by: RADIOLOGY

## 2020-11-10 PROCEDURE — 73140 XR FINGER 2 OR MORE VIEWS RIGHT: ICD-10-PCS | Mod: 26,RT,, | Performed by: RADIOLOGY

## 2020-11-10 PROCEDURE — 20605 DRAIN/INJ JOINT/BURSA W/O US: CPT | Mod: RT,S$GLB,, | Performed by: ORTHOPAEDIC SURGERY

## 2020-11-10 PROCEDURE — 99213 OFFICE O/P EST LOW 20 MIN: CPT | Mod: 25,S$GLB,, | Performed by: ORTHOPAEDIC SURGERY

## 2020-11-10 PROCEDURE — 99213 PR OFFICE/OUTPT VISIT, EST, LEVL III, 20-29 MIN: ICD-10-PCS | Mod: 25,S$GLB,, | Performed by: ORTHOPAEDIC SURGERY

## 2020-11-10 PROCEDURE — 73140 X-RAY EXAM OF FINGER(S): CPT | Mod: 26,RT,, | Performed by: RADIOLOGY

## 2020-11-10 PROCEDURE — 73110 X-RAY EXAM OF WRIST: CPT | Mod: TC,RT

## 2020-11-10 RX ORDER — TRIAMCINOLONE ACETONIDE 40 MG/ML
40 INJECTION, SUSPENSION INTRA-ARTICULAR; INTRAMUSCULAR
Status: DISCONTINUED | OUTPATIENT
Start: 2020-11-10 | End: 2020-11-10 | Stop reason: HOSPADM

## 2020-11-10 RX ADMIN — TRIAMCINOLONE ACETONIDE 40 MG: 40 INJECTION, SUSPENSION INTRA-ARTICULAR; INTRAMUSCULAR at 09:11

## 2020-11-10 NOTE — PROGRESS NOTES
Kelby Finley presents for post-operative evaluation.  The patient is now 6 mos s/p ORIF/external fixation right IF with extensor tendon repair.  He notes that he has had transportation difficulty and attending occupational therapy regularly has been difficult.  He has been unable to do this fully.  He has been performing home exercise program and he does note that his motion has improved since last evaluation to a significant extent.  He denies any numbness or tingling and overall the index finger is doing well.  His main complaint today is that his right wrist is incredibly stiff and painful.  He has a remote history of a scaphoid excision and 4 corner arthrodesis by a provider on the [place] at [facility].  This is his main issue today and he presents for re-evaluation.    PE:    AA&O x 4.  NAD  HEENT:  NCAT, sclera nonicteric  Lungs:  Respirations are equal and unlabored.  CV:  2+ bilateral upper and lower extremity pulses.  MSK: The wound is healing well with no signs of erythema or warmth.  There is no drainage today.  No clinical signs or symptoms of infection are present.  LTS intact to the IF.  Swelling is resolved and scar is well healed.  The patient can nearly make a full composite fist and has full motion at the MP and PIP joints from approximately 0-90 at the MP and 0-100 at the PIP joint with 10-45 degrees at the DIP joint.  After a gentle passive stretching exercise the patient can actively get the finger tip into the palm and there is no rotational deformity.  No evidence of any infection.  Wound completely healed..  Wrist ROM back to baseline s/p remove 4CA with no wrist extension whatsoever.    Imaging status postoperative fixation of right index finger middle phalanx fracture with bone grafting with no evidence of any complication; fracture appears healed today    Right wrist films demonstrate prior scaphoid excision 4 corner arthrodesis with some hardware prominence and failure  today    A/P: Status post above, doing well  1) the patient has made significant progress in regards to his index finger.  He has nearly full range of motion which is excellent given the significance of his injury and surgery.  He may continue with activities and motion as tolerated without any restrictions in regard to the index finger.  As far as his wrist pain, we did discuss that should this be symptomatic enough we can either consider hardware removal and or revision to wrist total arthrodesis.  The patient really does not have any wrist extension at this time and can only flex to 30°.  This wakes him up at night.  We would like to try a corticosteroid injection to the right wrist.  Should the patient's symptoms worsen persist or fail to improve he may follow up any time to further discuss surgical options.       Please be aware that this note has been generated with the assistance of [employer] voice-to-text.  Please excuse any spelling or grammatical errors.

## 2021-01-08 PROBLEM — S62.620B: Status: RESOLVED | Noted: 2020-05-13 | Resolved: 2021-01-08

## 2021-04-15 ENCOUNTER — PATIENT MESSAGE (OUTPATIENT)
Dept: RESEARCH | Facility: HOSPITAL | Age: 46
End: 2021-04-15

## 2021-07-12 PROBLEM — E66.9 OBESITY, CLASS II, BMI 35-39.9: Status: ACTIVE | Noted: 2019-06-03

## 2021-07-12 PROBLEM — E66.812 OBESITY, CLASS II, BMI 35-39.9: Status: ACTIVE | Noted: 2019-06-03

## 2022-04-13 DIAGNOSIS — Z12.11 COLON CANCER SCREENING: ICD-10-CM

## 2022-07-20 ENCOUNTER — LAB VISIT (OUTPATIENT)
Dept: LAB | Facility: HOSPITAL | Age: 47
End: 2022-07-20
Payer: COMMERCIAL

## 2022-07-20 DIAGNOSIS — Z12.11 COLON CANCER SCREENING: ICD-10-CM

## 2022-07-20 PROCEDURE — 82274 ASSAY TEST FOR BLOOD FECAL: CPT | Performed by: NURSE PRACTITIONER

## 2022-07-26 LAB — HEMOCCULT STL QL IA: NEGATIVE

## 2024-02-22 ENCOUNTER — PATIENT OUTREACH (OUTPATIENT)
Dept: ADMINISTRATIVE | Facility: HOSPITAL | Age: 49
End: 2024-02-22
Payer: COMMERCIAL

## 2024-03-04 ENCOUNTER — PATIENT MESSAGE (OUTPATIENT)
Dept: ADMINISTRATIVE | Facility: HOSPITAL | Age: 49
End: 2024-03-04
Payer: COMMERCIAL

## 2024-03-05 DIAGNOSIS — Z12.11 SCREENING FOR COLON CANCER: ICD-10-CM

## 2024-04-02 LAB — HEMOCCULT STL QL IA: NORMAL

## 2024-04-22 ENCOUNTER — PATIENT MESSAGE (OUTPATIENT)
Dept: ADMINISTRATIVE | Facility: HOSPITAL | Age: 49
End: 2024-04-22
Payer: COMMERCIAL

## 2024-04-22 DIAGNOSIS — Z12.11 COLON CANCER SCREENING: Primary | ICD-10-CM

## 2024-05-09 ENCOUNTER — LAB VISIT (OUTPATIENT)
Dept: LAB | Facility: HOSPITAL | Age: 49
End: 2024-05-09
Payer: COMMERCIAL

## 2024-05-09 DIAGNOSIS — Z12.11 COLON CANCER SCREENING: ICD-10-CM

## 2024-05-09 LAB — HEMOCCULT STL QL IA: NEGATIVE

## 2024-05-09 PROCEDURE — 82274 ASSAY TEST FOR BLOOD FECAL: CPT | Performed by: NURSE PRACTITIONER

## 2025-02-24 DIAGNOSIS — U07.1 COVID-19 VIRUS DETECTED: ICD-10-CM

## 2025-03-02 ENCOUNTER — NURSE TRIAGE (OUTPATIENT)
Dept: ADMINISTRATIVE | Facility: CLINIC | Age: 50
End: 2025-03-02
Payer: COMMERCIAL

## 2025-03-03 NOTE — TELEPHONE ENCOUNTER
Patient reports continuing with cough, fever and green sputum after completing paxlovid. O2sat 98. DIspo provided- see pcp within 2 weeks. Appointment made fro Wednesday. Instructed to call back with additional questions or worsening of symptoms. Patient verbalized understanding.     Reason for Disposition   [1] PERSISTING SYMPTOMS OF COVID-19 AND [2] symptoms SAME AND [3] medical visit for COVID-19 in past 2 weeks    Additional Information   Negative: SEVERE difficulty breathing (e.g., struggling for each breath, speaks in single words)   Negative: [1] SEVERE weakness (e.g., can't stand or can barely walk) AND [2] new-onset or WORSE   Negative: Difficult to awaken or acting confused (e.g., disoriented, slurred speech)   Negative: Bluish (or gray) lips or face now   Negative: Sounds like a life-threatening emergency to the triager   Negative: [1] MODERATE difficulty breathing AND [2] oxygen level (e.g., pulse oximetry) 91 to 94 percent   Negative: Oxygen level (e.g., pulse oximetry) 90 percent or lower   Negative: MODERATE difficulty breathing (e.g., speaks in phrases, SOB even at rest, pulse 100-120)   Negative: [1] Drinking very little AND [2] dehydration suspected (e.g., no urine > 12 hours, very dry mouth, very lightheaded)   Negative: Patient sounds very sick or weak to the triager   Negative: [1] MILD difficulty breathing (e.g., minimal/no SOB at rest, SOB with walking, pulse <100) AND [2] new-onset   Negative: [1] PERSISTING SYMPTOMS OF COVID-19 AND [2] NEW symptom AND [3] could be serious   Negative: Oxygen level (e.g., pulse oximetry) 91 to 94 percent   Negative: [1] Caller has URGENT question AND [2] triager unable to answer question   Negative: [1] PERSISTING SYMPTOMS OF COVID-19 AND [2] symptoms WORSE   Negative: [1] Caller has NON-URGENT question AND [2] triager unable to answer   Negative: [1] PERSISTING SYMPTOMS OF COVID-19 AND [2] NO medical visit for COVID-19 in past 2 weeks    Protocols used:  Coronavirus (COVID-19) Persisting Symptoms Follow-up Call-[facility]

## 2025-06-18 ENCOUNTER — PATIENT MESSAGE (OUTPATIENT)
Dept: ADMINISTRATIVE | Facility: HOSPITAL | Age: 50
End: 2025-06-18
Payer: COMMERCIAL

## 2025-06-19 DIAGNOSIS — Z12.11 SCREENING FOR COLON CANCER: ICD-10-CM

## (undated) DEVICE — Device

## (undated) DEVICE — DRAPE C ARM 42 X 120 10/BX

## (undated) DEVICE — TOURNIQUET SB QC DP 18X4IN

## (undated) DEVICE — SUT VICRYL 4-0 ANTIBACT

## (undated) DEVICE — APPLICATOR CHLORAPREP ORN 26ML

## (undated) DEVICE — SEE MEDLINE ITEM 152515

## (undated) DEVICE — K-WIRE SNGL TRCR .045 D 6L N/S
Type: IMPLANTABLE DEVICE | Site: FINGER | Status: NON-FUNCTIONAL
Removed: 2020-05-13

## (undated) DEVICE — BIT DRILL QR SURGIBIT 1.1X3.5

## (undated) DEVICE — ELECTRODE REM PLYHSV RETURN 9

## (undated) DEVICE — PAD CAST 2 IN X 4YDS STERILE

## (undated) DEVICE — SEE MEDLINE ITEM 157173

## (undated) DEVICE — BANDAGE ELASTIC 2X5 VELCRO ST

## (undated) DEVICE — PAD CAST SPECIALIST STRL 4

## (undated) DEVICE — GLOVE BIOGEL SKINSENSE PI 7.0

## (undated) DEVICE — GAUZE SPONGE 4X4 12PLY

## (undated) DEVICE — DRESSING XEROFORM FOIL PK 1X8

## (undated) DEVICE — SUT 3/0 18IN COATED VICRYLP

## (undated) DEVICE — SUT CTD VICRYL VIL BR SH 27

## (undated) DEVICE — STOCKINET 4INX48

## (undated) DEVICE — SLING ARM LARGE FOAM STRAP

## (undated) DEVICE — SEE MEDLINE ITEM 154981

## (undated) DEVICE — SEE MEDLINE ITEM 146308